# Patient Record
Sex: MALE | URBAN - METROPOLITAN AREA
[De-identification: names, ages, dates, MRNs, and addresses within clinical notes are randomized per-mention and may not be internally consistent; named-entity substitution may affect disease eponyms.]

---

## 2022-01-04 ENCOUNTER — OFFICE VISIT (OUTPATIENT)
Dept: URBAN - METROPOLITAN AREA CLINIC 96 | Facility: CLINIC | Age: 22
End: 2022-01-04

## 2022-05-17 ENCOUNTER — WEB ENCOUNTER (OUTPATIENT)
Dept: URBAN - METROPOLITAN AREA CLINIC 96 | Facility: CLINIC | Age: 22
End: 2022-05-17

## 2022-05-18 ENCOUNTER — OFFICE VISIT (OUTPATIENT)
Dept: URBAN - METROPOLITAN AREA CLINIC 96 | Facility: CLINIC | Age: 22
End: 2022-05-18
Payer: COMMERCIAL

## 2022-05-18 ENCOUNTER — LAB OUTSIDE AN ENCOUNTER (OUTPATIENT)
Dept: URBAN - METROPOLITAN AREA CLINIC 96 | Facility: CLINIC | Age: 22
End: 2022-05-18

## 2022-05-18 DIAGNOSIS — K50.90 CROHN DISEASE: ICD-10-CM

## 2022-05-18 DIAGNOSIS — Z79.899 LONG-TERM USE OF IMMUNOSUPPRESSANT MEDICATION: ICD-10-CM

## 2022-05-18 DIAGNOSIS — Z91.89 COLON CANCER HIGH RISK: ICD-10-CM

## 2022-05-18 PROCEDURE — 99204 OFFICE O/P NEW MOD 45 MIN: CPT | Performed by: INTERNAL MEDICINE

## 2022-05-18 RX ORDER — INFLIXIMAB 100 MG/10ML
AS DIRECTED INJECTION, POWDER, LYOPHILIZED, FOR SOLUTION INTRAVENOUS
Qty: 100 MILLIGRAMS | Refills: 0 | OUTPATIENT
Start: 2022-07-22 | End: 2022-08-21

## 2022-05-18 NOTE — PHYSICAL EXAM NECK/THYROID:
normal appearance , without tenderness upon palpation , no deformities , trachea midline , Thyroid normal size , no thyroid nodules , no masses , no JVD , thyroid nontender 53973 Detailed

## 2022-05-18 NOTE — HPI-TODAY'S VISIT:
Pt Dino is a 21 y/o with ileal CD, currently on Remicade 5mg/kg (every 8 weeks). . Pt is referred by Dr. El Ivan. . Pt was dxd with CD at age 15.  He was started on Remicade upon diagnosis.   No surgeries or fistula.  No hospitalizations or steroid use. . Today on 5/18/2022, pt reports that he has normal BM.  No diarrhea and or rectal bleeding.

## 2022-07-22 ENCOUNTER — CLAIMS CREATED FROM THE CLAIM WINDOW (OUTPATIENT)
Dept: URBAN - METROPOLITAN AREA SURGERY CENTER 18 | Facility: SURGERY CENTER | Age: 22
End: 2022-07-22
Payer: COMMERCIAL

## 2022-07-22 ENCOUNTER — CLAIMS CREATED FROM THE CLAIM WINDOW (OUTPATIENT)
Dept: URBAN - METROPOLITAN AREA SURGERY CENTER 18 | Facility: SURGERY CENTER | Age: 22
End: 2022-07-22

## 2022-07-22 DIAGNOSIS — K63.89 BACTERIAL OVERGROWTH SYNDROME: ICD-10-CM

## 2022-07-22 DIAGNOSIS — K50.00 CICATRIZING ENTEROCOLITIS: ICD-10-CM

## 2022-07-22 PROCEDURE — G8907 PT DOC NO EVENTS ON DISCHARG: HCPCS | Performed by: INTERNAL MEDICINE

## 2022-07-22 PROCEDURE — 45380 COLONOSCOPY AND BIOPSY: CPT | Performed by: INTERNAL MEDICINE

## 2022-07-30 ENCOUNTER — TELEPHONE ENCOUNTER (OUTPATIENT)
Dept: URBAN - METROPOLITAN AREA CLINIC 92 | Facility: CLINIC | Age: 22
End: 2022-07-30

## 2022-09-01 ENCOUNTER — TELEPHONE ENCOUNTER (OUTPATIENT)
Dept: URBAN - METROPOLITAN AREA CLINIC 92 | Facility: CLINIC | Age: 22
End: 2022-09-01

## 2022-09-24 LAB
A/G RATIO: 2
ALBUMIN: 4.8
ALKALINE PHOSPHATASE: 84
ALT (SGPT): 34
AST (SGOT): 34
BASO (ABSOLUTE): 0
BASOS: 0
BILIRUBIN, TOTAL: 1.3
BUN/CREATININE RATIO: 12
BUN: 12
CALCIUM: 9.9
CARBON DIOXIDE, TOTAL: 24
CHLORIDE: 99
CREATININE: 1.04
EGFR: 104
EOS (ABSOLUTE): 0.3
EOS: 5
GLOBULIN, TOTAL: 2.4
GLUCOSE: 112
HEMATOCRIT: 43.8
HEMATOLOGY COMMENTS:: (no result)
HEMOGLOBIN: 15.5
IMMATURE CELLS: (no result)
IMMATURE GRANS (ABS): 0
IMMATURE GRANULOCYTES: 0
LYMPHS (ABSOLUTE): 1.8
LYMPHS: 25
MCH: 31.5
MCHC: 35.4
MCV: 89
MONOCYTES(ABSOLUTE): 0.6
MONOCYTES: 8
NEUTROPHILS (ABSOLUTE): 4.6
NEUTROPHILS: 62
NRBC: (no result)
PLATELETS: 278
POTASSIUM: 4
PROTEIN, TOTAL: 7.2
QUANTIFERON CRITERIA: (no result)
QUANTIFERON INCUBATION: (no result)
QUANTIFERON MITOGEN VALUE: >10
QUANTIFERON NIL VALUE: 0.01
QUANTIFERON TB1 AG VALUE: 0
QUANTIFERON TB2 AG VALUE: 0.01
QUANTIFERON-TB GOLD PLUS: NEGATIVE
RBC: 4.92
RDW: 13.1
SODIUM: 138
WBC: 7.3

## 2022-09-28 ENCOUNTER — TELEPHONE ENCOUNTER (OUTPATIENT)
Dept: URBAN - METROPOLITAN AREA CLINIC 97 | Facility: CLINIC | Age: 22
End: 2022-09-28

## 2022-09-28 PROBLEM — 34000006 CROHN DISEASE: Status: ACTIVE | Noted: 2022-05-18

## 2022-10-06 ENCOUNTER — TELEPHONE ENCOUNTER (OUTPATIENT)
Dept: URBAN - METROPOLITAN AREA CLINIC 97 | Facility: CLINIC | Age: 22
End: 2022-10-06

## 2022-11-11 ENCOUNTER — TELEPHONE ENCOUNTER (OUTPATIENT)
Dept: URBAN - METROPOLITAN AREA CLINIC 96 | Facility: CLINIC | Age: 22
End: 2022-11-11

## 2022-11-15 ENCOUNTER — OFFICE VISIT (OUTPATIENT)
Dept: URBAN - NONMETROPOLITAN AREA CLINIC 1 | Facility: CLINIC | Age: 22
End: 2022-11-15

## 2022-11-22 ENCOUNTER — TELEPHONE ENCOUNTER (OUTPATIENT)
Dept: URBAN - METROPOLITAN AREA CLINIC 97 | Facility: CLINIC | Age: 22
End: 2022-11-22

## 2023-01-17 ENCOUNTER — OFFICE VISIT (OUTPATIENT)
Dept: URBAN - NONMETROPOLITAN AREA CLINIC 1 | Facility: CLINIC | Age: 23
End: 2023-01-17
Payer: COMMERCIAL

## 2023-01-17 VITALS
BODY MASS INDEX: 22.82 KG/M2 | WEIGHT: 163 LBS | DIASTOLIC BLOOD PRESSURE: 88 MMHG | HEIGHT: 71 IN | SYSTOLIC BLOOD PRESSURE: 148 MMHG

## 2023-01-17 DIAGNOSIS — K50.80 CROHN'S COLITIS: ICD-10-CM

## 2023-01-17 PROCEDURE — 96413 CHEMO IV INFUSION 1 HR: CPT | Performed by: INTERNAL MEDICINE

## 2023-01-18 PROBLEM — 71833008 CROHN'S DISEASE OF SMALL AND LARGE INTESTINES: Status: ACTIVE | Noted: 2023-01-18

## 2023-03-14 ENCOUNTER — OFFICE VISIT (OUTPATIENT)
Dept: URBAN - NONMETROPOLITAN AREA CLINIC 1 | Facility: CLINIC | Age: 23
End: 2023-03-14
Payer: COMMERCIAL

## 2023-03-14 VITALS
SYSTOLIC BLOOD PRESSURE: 108 MMHG | WEIGHT: 163.2 LBS | HEIGHT: 71 IN | DIASTOLIC BLOOD PRESSURE: 69 MMHG | BODY MASS INDEX: 22.85 KG/M2

## 2023-03-14 DIAGNOSIS — K50.80 CROHN'S COLITIS: ICD-10-CM

## 2023-03-14 PROCEDURE — 96413 CHEMO IV INFUSION 1 HR: CPT | Performed by: INTERNAL MEDICINE

## 2023-05-09 ENCOUNTER — OFFICE VISIT (OUTPATIENT)
Dept: URBAN - NONMETROPOLITAN AREA CLINIC 1 | Facility: CLINIC | Age: 23
End: 2023-05-09

## 2023-05-19 ENCOUNTER — TELEPHONE ENCOUNTER (OUTPATIENT)
Dept: URBAN - METROPOLITAN AREA CLINIC 6 | Facility: CLINIC | Age: 23
End: 2023-05-19

## 2023-05-22 ENCOUNTER — TELEPHONE ENCOUNTER (OUTPATIENT)
Dept: URBAN - METROPOLITAN AREA CLINIC 79 | Facility: CLINIC | Age: 23
End: 2023-05-22

## 2023-05-22 ENCOUNTER — OFFICE VISIT (OUTPATIENT)
Dept: URBAN - METROPOLITAN AREA CLINIC 79 | Facility: CLINIC | Age: 23
End: 2023-05-22
Payer: COMMERCIAL

## 2023-05-22 VITALS
HEIGHT: 71 IN | HEART RATE: 74 BPM | RESPIRATION RATE: 20 BRPM | TEMPERATURE: 98.1 F | WEIGHT: 163 LBS | BODY MASS INDEX: 22.82 KG/M2 | DIASTOLIC BLOOD PRESSURE: 77 MMHG | SYSTOLIC BLOOD PRESSURE: 129 MMHG

## 2023-05-22 DIAGNOSIS — K50.80 CROHN'S COLITIS: ICD-10-CM

## 2023-05-22 PROCEDURE — 96413 CHEMO IV INFUSION 1 HR: CPT | Performed by: INTERNAL MEDICINE

## 2023-07-03 ENCOUNTER — OFFICE VISIT (OUTPATIENT)
Dept: URBAN - METROPOLITAN AREA CLINIC 79 | Facility: CLINIC | Age: 23
End: 2023-07-03

## 2023-07-17 ENCOUNTER — OFFICE VISIT (OUTPATIENT)
Dept: URBAN - METROPOLITAN AREA CLINIC 79 | Facility: CLINIC | Age: 23
End: 2023-07-17
Payer: COMMERCIAL

## 2023-07-17 VITALS
DIASTOLIC BLOOD PRESSURE: 72 MMHG | TEMPERATURE: 98.2 F | SYSTOLIC BLOOD PRESSURE: 111 MMHG | RESPIRATION RATE: 20 BRPM | HEIGHT: 71 IN | WEIGHT: 171 LBS | HEART RATE: 67 BPM | BODY MASS INDEX: 23.94 KG/M2

## 2023-07-17 DIAGNOSIS — K50.80 CROHN'S COLITIS: ICD-10-CM

## 2023-07-17 PROCEDURE — 96413 CHEMO IV INFUSION 1 HR: CPT | Performed by: INTERNAL MEDICINE

## 2023-07-19 ENCOUNTER — OFFICE VISIT (OUTPATIENT)
Dept: URBAN - METROPOLITAN AREA CLINIC 96 | Facility: CLINIC | Age: 23
End: 2023-07-19
Payer: COMMERCIAL

## 2023-07-19 ENCOUNTER — WEB ENCOUNTER (OUTPATIENT)
Dept: URBAN - METROPOLITAN AREA CLINIC 96 | Facility: CLINIC | Age: 23
End: 2023-07-19

## 2023-07-19 ENCOUNTER — DASHBOARD ENCOUNTERS (OUTPATIENT)
Age: 23
End: 2023-07-19

## 2023-07-19 VITALS
TEMPERATURE: 97.7 F | HEIGHT: 71 IN | SYSTOLIC BLOOD PRESSURE: 127 MMHG | WEIGHT: 161.6 LBS | DIASTOLIC BLOOD PRESSURE: 87 MMHG | BODY MASS INDEX: 22.62 KG/M2 | HEART RATE: 83 BPM

## 2023-07-19 DIAGNOSIS — Z91.89 COLON CANCER HIGH RISK: ICD-10-CM

## 2023-07-19 DIAGNOSIS — K50.90 CROHN DISEASE: ICD-10-CM

## 2023-07-19 PROCEDURE — 99214 OFFICE O/P EST MOD 30 MIN: CPT | Performed by: INTERNAL MEDICINE

## 2023-07-19 NOTE — HPI-TODAY'S VISIT:
Pt Dino is a 24 y/o with ileal CD, currently on Remicade 5mg/kg (every 8 weeks). . Pt is referred by Dr. El Ivan. . Pt was dxd with CD at age 15.  He was started on Remicade upon diagnosis.   No surgeries or fistula.  No hospitalizations or steroid use. . Previously on 5/18/2022, pt reports that he has normal BM.  No diarrhea and or rectal bleeding. . Today on 7/19/2023, pt reports normal BM.  No Gi issues. . 7/2022: The terminal ileum appeared normal. Biopsies were taken with a cold forceps for histology. The pathology specimen was placed into Bottle Number 1. An area of erythematous mucosa was found in the ascending colon. This was biopsied with a cold forceps for histology. The pathology specimen was placed into Bottle Number 2. The descending colon appeared normal. Biopsies were taken with a cold forceps for histology. The pathology specimen was placed into Bottle Number 3. The exam was otherwise without abnormality on direct and retroflexion views. Overall, good control of disease state. . Final Pathologic Diagnosis A Terminal ileum, biopsy: Small bowel mucosa with no significant histopathology. No histologic evidence of acute inflammation or infectious microorganisms. B Ascending colon, biopsy: Unremarkable colonic mucosa with a lymphoid aggregate. C Descending colon, biopsy: Unremarkable colonic mucosa with a lymphoid aggregate.

## 2023-08-01 ENCOUNTER — LAB OUTSIDE AN ENCOUNTER (OUTPATIENT)
Dept: URBAN - METROPOLITAN AREA CLINIC 96 | Facility: CLINIC | Age: 23
End: 2023-08-01

## 2023-08-05 LAB
A/G RATIO: 1.9
ABSOLUTE BASOPHILS: 28
ABSOLUTE EOSINOPHILS: 280
ABSOLUTE LYMPHOCYTES: 1915
ABSOLUTE MONOCYTES: 392
ABSOLUTE NEUTROPHILS: 2985
ALBUMIN: 4.7
ALKALINE PHOSPHATASE: 70
ALT (SGPT): 36
AST (SGOT): 29
BASOPHILS: 0.5
BILIRUBIN, TOTAL: 0.5
BUN/CREATININE RATIO: (no result)
BUN: 10
CALCIUM: 9.6
CARBON DIOXIDE, TOTAL: 25
CHLORIDE: 105
CREATININE: 0.9
EGFR: 123
EOSINOPHILS: 5
FERRITIN, SERUM: 59
GLOBULIN, TOTAL: 2.5
GLUCOSE: 88
HEMATOCRIT: 48.2
HEMOGLOBIN: 16.9
LYMPHOCYTES: 34.2
MCH: 31.2
MCHC: 35.1
MCV: 89.1
MITOGEN-NIL: >10
MONOCYTES: 7
MPV: 9.7
NEUTROPHILS: 53.3
PLATELET COUNT: 262
POTASSIUM: 4.3
PROTEIN, TOTAL: 7.2
QUANTIFERON NIL VALUE: 0.02
QUANTIFERON TB1 AG VALUE: 0
QUANTIFERON TB2 AG VALUE: 0
QUANTIFERON-TB GOLD PLUS: NEGATIVE
RDW: 11.8
RED BLOOD CELL COUNT: 5.41
SODIUM: 140
VITAMIN B12: 429
VITAMIN D,25-OH,TOTAL,IA: 36
WHITE BLOOD CELL COUNT: 5.6

## 2023-09-13 ENCOUNTER — TELEPHONE ENCOUNTER (OUTPATIENT)
Dept: URBAN - METROPOLITAN AREA CLINIC 79 | Facility: CLINIC | Age: 23
End: 2023-09-13

## 2023-09-21 ENCOUNTER — OFFICE VISIT (OUTPATIENT)
Dept: URBAN - METROPOLITAN AREA CLINIC 79 | Facility: CLINIC | Age: 23
End: 2023-09-21
Payer: COMMERCIAL

## 2023-09-21 VITALS
HEIGHT: 71 IN | HEART RATE: 73 BPM | RESPIRATION RATE: 18 BRPM | BODY MASS INDEX: 24.08 KG/M2 | WEIGHT: 172 LBS | SYSTOLIC BLOOD PRESSURE: 137 MMHG | DIASTOLIC BLOOD PRESSURE: 69 MMHG | TEMPERATURE: 98.4 F

## 2023-09-21 DIAGNOSIS — K50.80 CROHN'S COLITIS: ICD-10-CM

## 2023-09-21 PROCEDURE — 96413 CHEMO IV INFUSION 1 HR: CPT | Performed by: INTERNAL MEDICINE

## 2023-11-02 ENCOUNTER — TELEPHONE ENCOUNTER (OUTPATIENT)
Dept: URBAN - METROPOLITAN AREA CLINIC 97 | Facility: CLINIC | Age: 23
End: 2023-11-02

## 2023-11-10 ENCOUNTER — OFFICE VISIT (OUTPATIENT)
Dept: URBAN - METROPOLITAN AREA CLINIC 79 | Facility: CLINIC | Age: 23
End: 2023-11-10

## 2023-11-17 ENCOUNTER — OFFICE VISIT (OUTPATIENT)
Dept: URBAN - METROPOLITAN AREA CLINIC 79 | Facility: CLINIC | Age: 23
End: 2023-11-17
Payer: COMMERCIAL

## 2023-11-17 VITALS
TEMPERATURE: 98.2 F | BODY MASS INDEX: 23.1 KG/M2 | RESPIRATION RATE: 18 BRPM | WEIGHT: 165 LBS | DIASTOLIC BLOOD PRESSURE: 79 MMHG | HEART RATE: 52 BPM | SYSTOLIC BLOOD PRESSURE: 145 MMHG | HEIGHT: 71 IN

## 2023-11-17 DIAGNOSIS — K50.80 CROHN'S COLITIS: ICD-10-CM

## 2023-11-17 PROCEDURE — 96413 CHEMO IV INFUSION 1 HR: CPT | Performed by: INTERNAL MEDICINE

## 2023-12-29 ENCOUNTER — TELEPHONE ENCOUNTER (OUTPATIENT)
Dept: URBAN - METROPOLITAN AREA CLINIC 23 | Facility: CLINIC | Age: 23
End: 2023-12-29

## 2024-01-08 ENCOUNTER — OFFICE VISIT (OUTPATIENT)
Dept: URBAN - METROPOLITAN AREA CLINIC 97 | Facility: CLINIC | Age: 24
End: 2024-01-08
Payer: COMMERCIAL

## 2024-01-08 VITALS
RESPIRATION RATE: 18 BRPM | DIASTOLIC BLOOD PRESSURE: 76 MMHG | HEIGHT: 71 IN | WEIGHT: 168 LBS | HEART RATE: 60 BPM | BODY MASS INDEX: 23.52 KG/M2 | TEMPERATURE: 99 F | SYSTOLIC BLOOD PRESSURE: 137 MMHG

## 2024-01-08 DIAGNOSIS — K50.80 CROHN'S COLITIS: ICD-10-CM

## 2024-01-08 PROCEDURE — 96413 CHEMO IV INFUSION 1 HR: CPT | Performed by: INTERNAL MEDICINE

## 2024-01-12 ENCOUNTER — TELEPHONE ENCOUNTER (OUTPATIENT)
Dept: URBAN - METROPOLITAN AREA CLINIC 96 | Facility: CLINIC | Age: 24
End: 2024-01-12

## 2024-01-12 ENCOUNTER — OFFICE VISIT (OUTPATIENT)
Dept: URBAN - METROPOLITAN AREA CLINIC 79 | Facility: CLINIC | Age: 24
End: 2024-01-12

## 2024-05-06 ENCOUNTER — WEB ENCOUNTER (OUTPATIENT)
Dept: URBAN - METROPOLITAN AREA CLINIC 96 | Facility: CLINIC | Age: 24
End: 2024-05-06

## 2024-07-15 ENCOUNTER — TELEPHONE ENCOUNTER (OUTPATIENT)
Dept: URBAN - METROPOLITAN AREA CLINIC 96 | Facility: CLINIC | Age: 24
End: 2024-07-15

## 2024-07-15 ENCOUNTER — LAB OUTSIDE AN ENCOUNTER (OUTPATIENT)
Dept: URBAN - METROPOLITAN AREA TELEHEALTH 2 | Facility: TELEHEALTH | Age: 24
End: 2024-07-15

## 2024-07-15 ENCOUNTER — OFFICE VISIT (OUTPATIENT)
Dept: URBAN - METROPOLITAN AREA TELEHEALTH 2 | Facility: TELEHEALTH | Age: 24
End: 2024-07-15
Payer: COMMERCIAL

## 2024-07-15 VITALS — BODY MASS INDEX: 21 KG/M2 | HEIGHT: 71 IN | WEIGHT: 150 LBS

## 2024-07-15 DIAGNOSIS — K50.80 CROHN'S COLITIS: ICD-10-CM

## 2024-07-15 DIAGNOSIS — Z79.899 LONG-TERM USE OF IMMUNOSUPPRESSANT MEDICATION: ICD-10-CM

## 2024-07-15 DIAGNOSIS — Z91.89 COLON CANCER HIGH RISK: ICD-10-CM

## 2024-07-15 PROCEDURE — 99214 OFFICE O/P EST MOD 30 MIN: CPT | Performed by: INTERNAL MEDICINE

## 2024-07-15 RX ORDER — INFLIXIMAB-AXXQ 100 MG/10ML
AS DIRECTED INJECTION, POWDER, LYOPHILIZED, FOR SOLUTION INTRAVENOUS
Status: ACTIVE | COMMUNITY

## 2024-07-15 RX ORDER — INFLIXIMAB 100 MG/10ML
AS DIRECTED INJECTION, POWDER, LYOPHILIZED, FOR SOLUTION INTRAVENOUS
OUTPATIENT
Start: 2024-07-15

## 2024-07-15 NOTE — HPI-TODAY'S VISIT:
Cecil Sun is a 22 y/o with ileal CD, currently on Avsola 5mg/kg (every 8 weeks). . Pt is referred by Dr. El Ivan. . Pt was dxd with CD at age 15.  He was started on Remicade upon diagnosis.   No surgeries or fistula.  No hospitalizations or steroid use. . Previously on 5/18/2022, pt reports that he has normal BM.  No diarrhea and or rectal bleeding. . Previously on 7/19/2023, pt reports normal BM.  No Gi issues. . Today on 7/15/2024, pt overall doing well. He has flares every now and then, not prior to each infusion but very mild abd pain and some diarrhea. Typically no pattern. . 7/2022: The terminal ileum appeared normal. Biopsies were taken with a cold forceps for histology. The pathology specimen was placed into Bottle Number 1. An area of erythematous mucosa was found in the ascending colon. This was biopsied with a cold forceps for histology. The pathology specimen was placed into Bottle Number 2. The descending colon appeared normal. Biopsies were taken with a cold forceps for histology. The pathology specimen was placed into Bottle Number 3. The exam was otherwise without abnormality on direct and retroflexion views. Overall, good control of disease state. . Final Pathologic Diagnosis A Terminal ileum, biopsy: Small bowel mucosa with no significant histopathology. No histologic evidence of acute inflammation or infectious microorganisms. B Ascending colon, biopsy: Unremarkable colonic mucosa with a lymphoid aggregate. C Descending colon, biopsy: Unremarkable colonic mucosa with a lymphoid aggregate.

## 2024-07-22 ENCOUNTER — WEB ENCOUNTER (OUTPATIENT)
Dept: URBAN - METROPOLITAN AREA CLINIC 98 | Facility: CLINIC | Age: 24
End: 2024-07-22

## 2024-07-31 ENCOUNTER — OFFICE VISIT (OUTPATIENT)
Dept: URBAN - METROPOLITAN AREA TELEHEALTH 2 | Facility: TELEHEALTH | Age: 24
End: 2024-07-31
Payer: COMMERCIAL

## 2024-07-31 DIAGNOSIS — Z79.899 LONG-TERM USE OF IMMUNOSUPPRESSANT MEDICATION: ICD-10-CM

## 2024-07-31 DIAGNOSIS — Z91.89 COLON CANCER HIGH RISK: ICD-10-CM

## 2024-07-31 DIAGNOSIS — K50.90 CROHN DISEASE: ICD-10-CM

## 2024-07-31 PROCEDURE — 99213 OFFICE O/P EST LOW 20 MIN: CPT | Performed by: INTERNAL MEDICINE

## 2024-07-31 RX ORDER — INFLIXIMAB 100 MG/10ML
AS DIRECTED INJECTION, POWDER, LYOPHILIZED, FOR SOLUTION INTRAVENOUS
OUTPATIENT

## 2024-07-31 RX ORDER — INFLIXIMAB-AXXQ 100 MG/10ML
AS DIRECTED INJECTION, POWDER, LYOPHILIZED, FOR SOLUTION INTRAVENOUS
Status: ACTIVE | COMMUNITY

## 2024-07-31 RX ORDER — INFLIXIMAB 100 MG/10ML
AS DIRECTED INJECTION, POWDER, LYOPHILIZED, FOR SOLUTION INTRAVENOUS
Status: ACTIVE | COMMUNITY
Start: 2024-07-15

## 2024-07-31 NOTE — HPI-TODAY'S VISIT:
Pt Dino is a 25 y/o with ileal CD, currently on Avsola 5mg/kg (every 8 weeks). . Pt is referred by Dr. El Ivan. . Pt was dxd with CD at age 15.  He was started on Remicade upon diagnosis.   No surgeries or fistula.  No hospitalizations or steroid use. . Previously on 5/18/2022, pt reports that he has normal BM.  No diarrhea and or rectal bleeding. . Previously on 7/19/2023, pt reports normal BM.  No Gi issues. . Prev on 7/15/2024, pt overall doing well. He has flares every now and then, not prior to each infusion but very mild abd pain and some diarrhea. Typically no pattern. . Today on 7/31/2024, pt reports incomplete evacuation of urine.  No diarrhea, abd pain, rectal bleeding. . 7/2022: The terminal ileum appeared normal. Biopsies were taken with a cold forceps for histology. The pathology specimen was placed into Bottle Number 1. An area of erythematous mucosa was found in the ascending colon. This was biopsied with a cold forceps for histology. The pathology specimen was placed into Bottle Number 2. The descending colon appeared normal. Biopsies were taken with a cold forceps for histology. The pathology specimen was placed into Bottle Number 3. The exam was otherwise without abnormality on direct and retroflexion views. Overall, good control of disease state. . Final Pathologic Diagnosis A Terminal ileum, biopsy: Small bowel mucosa with no significant histopathology. No histologic evidence of acute inflammation or infectious microorganisms. B Ascending colon, biopsy: Unremarkable colonic mucosa with a lymphoid aggregate. C Descending colon, biopsy: Unremarkable colonic mucosa with a lymphoid aggregate.

## 2024-08-21 ENCOUNTER — TELEPHONE ENCOUNTER (OUTPATIENT)
Dept: URBAN - METROPOLITAN AREA CLINIC 96 | Facility: CLINIC | Age: 24
End: 2024-08-21

## 2024-08-26 ENCOUNTER — LAB OUTSIDE AN ENCOUNTER (OUTPATIENT)
Dept: URBAN - METROPOLITAN AREA CLINIC 96 | Facility: CLINIC | Age: 24
End: 2024-08-26

## 2024-08-26 ENCOUNTER — WEB ENCOUNTER (OUTPATIENT)
Dept: URBAN - METROPOLITAN AREA CLINIC 96 | Facility: CLINIC | Age: 24
End: 2024-08-26

## 2024-09-27 ENCOUNTER — WEB ENCOUNTER (OUTPATIENT)
Dept: URBAN - METROPOLITAN AREA CLINIC 98 | Facility: CLINIC | Age: 24
End: 2024-09-27

## 2024-10-01 ENCOUNTER — WEB ENCOUNTER (OUTPATIENT)
Dept: URBAN - METROPOLITAN AREA CLINIC 98 | Facility: CLINIC | Age: 24
End: 2024-10-01

## 2024-10-04 ENCOUNTER — TELEPHONE ENCOUNTER (OUTPATIENT)
Dept: URBAN - METROPOLITAN AREA CLINIC 96 | Facility: CLINIC | Age: 24
End: 2024-10-04

## 2024-10-04 RX ORDER — SODIUM, POTASSIUM,MAG SULFATES 17.5-3.13G
AS DIRECTED SOLUTION, RECONSTITUTED, ORAL ORAL
Qty: 1 | Refills: 0 | OUTPATIENT
Start: 2024-10-07 | End: 2024-10-09

## 2024-10-11 ENCOUNTER — CLAIMS CREATED FROM THE CLAIM WINDOW (OUTPATIENT)
Dept: URBAN - METROPOLITAN AREA SURGERY CENTER 18 | Facility: SURGERY CENTER | Age: 24
End: 2024-10-11
Payer: COMMERCIAL

## 2024-10-11 ENCOUNTER — CLAIMS CREATED FROM THE CLAIM WINDOW (OUTPATIENT)
Dept: URBAN - METROPOLITAN AREA CLINIC 4 | Facility: CLINIC | Age: 24
End: 2024-10-11
Payer: COMMERCIAL

## 2024-10-11 ENCOUNTER — WEB ENCOUNTER (OUTPATIENT)
Dept: URBAN - METROPOLITAN AREA CLINIC 98 | Facility: CLINIC | Age: 24
End: 2024-10-11

## 2024-10-11 DIAGNOSIS — K50.00 CROHN''S DISEASE OF ILEUM WITHOUT COMPLICATION: ICD-10-CM

## 2024-10-11 DIAGNOSIS — K50.00 CROHN'S DISEASE OF ILEUM: ICD-10-CM

## 2024-10-11 DIAGNOSIS — K63.89 OTHER SPECIFIED DISEASES OF INTESTINE: ICD-10-CM

## 2024-10-11 PROCEDURE — 88305 TISSUE EXAM BY PATHOLOGIST: CPT | Performed by: PATHOLOGY

## 2024-10-11 PROCEDURE — 00812 ANES LWR INTST SCR COLSC: CPT | Performed by: REGISTERED NURSE

## 2024-10-11 PROCEDURE — 45380 COLONOSCOPY AND BIOPSY: CPT | Performed by: INTERNAL MEDICINE

## 2024-10-11 RX ORDER — FOLIC ACID 1 MG/1
1 TABLET TABLET ORAL ONCE A DAY
Qty: 30 | Refills: 11
Start: 2024-10-11 | End: 2025-10-09

## 2024-10-11 RX ORDER — FOLIC ACID 1 MG/1
1 TABLET TABLET ORAL ONCE A DAY
Qty: 30 | Refills: 11 | OUTPATIENT
Start: 2024-10-11 | End: 2025-10-06

## 2024-10-11 RX ORDER — METHOTREXATE SODIUM 2.5 MG/1
3 TAB TABLET ORAL WEEKLY
Qty: 12 | Refills: 4
Start: 2024-10-11

## 2024-10-11 RX ORDER — INFLIXIMAB 100 MG/10ML
AS DIRECTED INJECTION, POWDER, LYOPHILIZED, FOR SOLUTION INTRAVENOUS
Status: ACTIVE | COMMUNITY

## 2024-10-11 RX ORDER — METHOTREXATE SODIUM 2.5 MG/1
3 TAB TABLET ORAL WEEKLY
Qty: 12 | Refills: 4 | OUTPATIENT
Start: 2024-10-11

## 2024-10-11 RX ORDER — INFLIXIMAB-AXXQ 100 MG/10ML
AS DIRECTED INJECTION, POWDER, LYOPHILIZED, FOR SOLUTION INTRAVENOUS
Status: ACTIVE | COMMUNITY

## 2024-10-15 ENCOUNTER — WEB ENCOUNTER (OUTPATIENT)
Dept: URBAN - METROPOLITAN AREA CLINIC 98 | Facility: CLINIC | Age: 24
End: 2024-10-15

## 2024-10-15 RX ORDER — FOLIC ACID 1 MG/1
1 TABLET TABLET ORAL ONCE A DAY
Qty: 30 | Refills: 11
Start: 2024-10-11 | End: 2025-10-11

## 2024-10-15 RX ORDER — METHOTREXATE SODIUM 2.5 MG/1
3 TAB TABLET ORAL WEEKLY
Qty: 12 | Refills: 4
Start: 2024-10-11

## 2025-02-19 ENCOUNTER — WEB ENCOUNTER (OUTPATIENT)
Dept: URBAN - METROPOLITAN AREA CLINIC 96 | Facility: CLINIC | Age: 25
End: 2025-02-19

## 2025-02-26 ENCOUNTER — OFFICE VISIT (OUTPATIENT)
Dept: URBAN - METROPOLITAN AREA TELEHEALTH 2 | Facility: TELEHEALTH | Age: 25
End: 2025-02-26
Payer: COMMERCIAL

## 2025-02-26 DIAGNOSIS — Z91.89 COLON CANCER HIGH RISK: ICD-10-CM

## 2025-02-26 DIAGNOSIS — K50.019 CROHN'S DISEASE OF ILEUM WITH COMPLICATION: ICD-10-CM

## 2025-02-26 DIAGNOSIS — Z79.899 LONG-TERM USE OF IMMUNOSUPPRESSANT MEDICATION: ICD-10-CM

## 2025-02-26 PROCEDURE — 99214 OFFICE O/P EST MOD 30 MIN: CPT | Performed by: INTERNAL MEDICINE

## 2025-02-26 RX ORDER — BUDESONIDE 3 MG/1
3 CAPSULES CAPSULE, DELAYED RELEASE PELLETS ORAL ONCE A DAY
Qty: 90 CAPSULE | Refills: 11 | OUTPATIENT
Start: 2025-02-26

## 2025-02-26 RX ORDER — METHOTREXATE SODIUM 2.5 MG/1
3 TAB TABLET ORAL WEEKLY
Qty: 12 | Refills: 4 | Status: ACTIVE | COMMUNITY
Start: 2024-10-11

## 2025-02-26 RX ORDER — HYOSCYAMINE SULFATE 0.12 MG/1
1 TABLET AS NEEDED TABLET ORAL
Qty: 120 | Refills: 3 | OUTPATIENT
Start: 2025-02-26 | End: 2025-06-26

## 2025-02-26 RX ORDER — INFLIXIMAB-AXXQ 100 MG/10ML
AS DIRECTED INJECTION, POWDER, LYOPHILIZED, FOR SOLUTION INTRAVENOUS
Status: ON HOLD | COMMUNITY

## 2025-02-26 RX ORDER — FOLIC ACID 1 MG/1
1 TABLET TABLET ORAL ONCE A DAY
Qty: 30 | Refills: 11 | Status: ACTIVE | COMMUNITY
Start: 2024-10-11 | End: 2025-10-11

## 2025-02-26 RX ORDER — INFLIXIMAB 100 MG/10ML
AS DIRECTED INJECTION, POWDER, LYOPHILIZED, FOR SOLUTION INTRAVENOUS
OUTPATIENT

## 2025-02-26 RX ORDER — INFLIXIMAB 100 MG/10ML
AS DIRECTED INJECTION, POWDER, LYOPHILIZED, FOR SOLUTION INTRAVENOUS
Status: ON HOLD | COMMUNITY

## 2025-02-26 NOTE — HPI-TODAY'S VISIT:
Pt Dino is a 24 y/o with ileal CD, currently on Avsola 5mg/kg (every 8 weeks), MTX 3 tab (started 2023). . Pt is referred by Dr. El Ivna. . Pt was dxd with CD at age 15.  He was started on Remicade upon diagnosis.   No surgeries or fistula.  No hospitalizations or steroid use. . Previously on 5/18/2022, pt reports that he has normal BM.  No diarrhea and or rectal bleeding. . Previously on 7/19/2023, pt reports normal BM.  No Gi issues. . Prev on 7/15/2024, pt overall doing well. He has flares every now and then, not prior to each infusion but very mild abd pain and some diarrhea. Typically no pattern. . Prev on 7/31/2024, pt reports incomplete evacuation of urine.  No diarrhea, abd pain, rectal bleeding. . Today on 2/26/2025, pt reports that he has some lower abdominal pain, episodes of bowel incontinence, then urgency sxs with sxs of blockage. . 10/2024: The exam was otherwise without abnormality. No additional abnormalities were found on retroflexion. Biopsies were obtained in the terminal ileum with cold forceps for histology. The colon (entire examined portion) appeared normal. Scattered mild inflammation characterized by erosions, granularity and erythema was found in the terminal ileum. . Terminal Ileum, Biopsy (Cold Forceps): CHRONIC MILDLY ACTIVE ILEITIS. Negative for Dysplasi . 7/2022: The terminal ileum appeared normal. Biopsies were taken with a cold forceps for histology. The pathology specimen was placed into Bottle Number 1. An area of erythematous mucosa was found in the ascending colon. This was biopsied with a cold forceps for histology. The pathology specimen was placed into Bottle Number 2. The descending colon appeared normal. Biopsies were taken with a cold forceps for histology. The pathology specimen was placed into Bottle Number 3. The exam was otherwise without abnormality on direct and retroflexion views. Overall, good control of disease state. . Final Pathologic Diagnosis A Terminal ileum, biopsy: Small bowel mucosa with no significant histopathology. No histologic evidence of acute inflammation or infectious microorganisms. B Ascending colon, biopsy: Unremarkable colonic mucosa with a lymphoid aggregate. C Descending colon, biopsy: Unremarkable colonic mucosa with a lymphoid aggregate . SH: works at [employer]

## 2025-03-09 ENCOUNTER — WEB ENCOUNTER (OUTPATIENT)
Dept: URBAN - METROPOLITAN AREA CLINIC 96 | Facility: CLINIC | Age: 25
End: 2025-03-09

## 2025-03-09 RX ORDER — BUDESONIDE 3 MG/1
3 CAPSULES CAPSULE, DELAYED RELEASE PELLETS ORAL ONCE A DAY
Qty: 90 CAPSULE | Refills: 11
Start: 2025-02-26

## 2025-03-26 ENCOUNTER — TELEPHONE ENCOUNTER (OUTPATIENT)
Dept: URBAN - METROPOLITAN AREA CLINIC 96 | Facility: CLINIC | Age: 25
End: 2025-03-26

## 2025-03-26 RX ORDER — METHOTREXATE SODIUM 2.5 MG/1
3 TAB TABLET ORAL WEEKLY
Qty: 12 | Refills: 4
Start: 2024-10-11

## 2025-03-26 RX ORDER — FOLIC ACID 1 MG/1
1 TABLET TABLET ORAL ONCE A DAY
Qty: 30 | Refills: 11
Start: 2024-10-11

## 2025-04-16 ENCOUNTER — WEB ENCOUNTER (OUTPATIENT)
Dept: URBAN - METROPOLITAN AREA CLINIC 96 | Facility: CLINIC | Age: 25
End: 2025-04-16

## 2025-05-13 ENCOUNTER — OFFICE VISIT (OUTPATIENT)
Dept: URBAN - METROPOLITAN AREA TELEHEALTH 2 | Facility: TELEHEALTH | Age: 25
End: 2025-05-13
Payer: COMMERCIAL

## 2025-05-13 DIAGNOSIS — T88.7XXA DRUG REACTION: ICD-10-CM

## 2025-05-13 DIAGNOSIS — Z79.899 LONG-TERM USE OF IMMUNOSUPPRESSANT MEDICATION: ICD-10-CM

## 2025-05-13 DIAGNOSIS — K50.019 CROHN'S DISEASE OF ILEUM WITH COMPLICATION: ICD-10-CM

## 2025-05-13 DIAGNOSIS — Z91.89 COLON CANCER HIGH RISK: ICD-10-CM

## 2025-05-13 PROCEDURE — 99215 OFFICE O/P EST HI 40 MIN: CPT | Performed by: INTERNAL MEDICINE

## 2025-05-13 RX ORDER — HYOSCYAMINE SULFATE 0.12 MG/1
1 TABLET AS NEEDED TABLET ORAL
Qty: 120 | Refills: 3 | Status: ACTIVE | COMMUNITY
Start: 2025-02-26 | End: 2025-06-26

## 2025-05-13 RX ORDER — HYOSCYAMINE SULFATE 0.12 MG/1
1 TABLET AS NEEDED TABLET ORAL
Qty: 120 | Refills: 3 | OUTPATIENT
Start: 2025-05-13

## 2025-05-13 RX ORDER — BUDESONIDE 3 MG/1
3 CAPSULES CAPSULE, DELAYED RELEASE PELLETS ORAL ONCE A DAY
Qty: 90 CAPSULE | Refills: 11 | OUTPATIENT
Start: 2025-05-13

## 2025-05-13 RX ORDER — METHOTREXATE SODIUM 2.5 MG/1
3 TAB TABLET ORAL WEEKLY
Qty: 12 | Refills: 4 | Status: ACTIVE | COMMUNITY
Start: 2024-10-11

## 2025-05-13 RX ORDER — INFLIXIMAB 100 MG/10ML
AS DIRECTED INJECTION, POWDER, LYOPHILIZED, FOR SOLUTION INTRAVENOUS
Status: ON HOLD | COMMUNITY

## 2025-05-13 RX ORDER — INFLIXIMAB-AXXQ 100 MG/10ML
AS DIRECTED INJECTION, POWDER, LYOPHILIZED, FOR SOLUTION INTRAVENOUS
Status: ON HOLD | COMMUNITY

## 2025-05-13 RX ORDER — BUDESONIDE 3 MG/1
3 CAPSULES CAPSULE, DELAYED RELEASE PELLETS ORAL ONCE A DAY
Qty: 90 CAPSULE | Refills: 11 | Status: ACTIVE | COMMUNITY
Start: 2025-02-26

## 2025-05-13 RX ORDER — INFLIXIMAB 100 MG/10ML
AS DIRECTED INJECTION, POWDER, LYOPHILIZED, FOR SOLUTION INTRAVENOUS
OUTPATIENT
Start: 2025-05-13

## 2025-05-13 RX ORDER — FOLIC ACID 1 MG/1
1 TABLET TABLET ORAL ONCE A DAY
Qty: 30 | Refills: 11 | Status: ACTIVE | COMMUNITY
Start: 2024-10-11

## 2025-05-13 NOTE — HPI-TODAY'S VISIT:
Pt Dino is a 26 y/o with ileal CD, currently on Avsola 5mg/kg (every 8 weeks), MTX 3 tab (started 2023). . Pt is referred by Dr. El Ivan. . Pt was dxd with CD at age 15.  He was started on Remicade upon diagnosis.   No surgeries or fistula.  No hospitalizations or steroid use. . Previously on 5/18/2022, pt reports that he has normal BM.  No diarrhea and or rectal bleeding. . Previously on 7/19/2023, pt reports normal BM.  No Gi issues. . Prev on 7/15/2024, pt overall doing well. He has flares every now and then, not prior to each infusion but very mild abd pain and some diarrhea. Typically no pattern. . Prev on 7/31/2024, pt reports incomplete evacuation of urine.  No diarrhea, abd pain, rectal bleeding. Prev on 2/26/2025, pt reports that he has some lower abdominal pain, episodes of bowel incontinence, then urgency sxs with sxs of blockage. . Today on 5/13/2025, pt reports that he still has occasional abd pain, no gerd sxs, no constipation or diarrhea.  He did better on Remicade compared to prior. . 10/2024: The exam was otherwise without abnormality. No additional abnormalities were found on retroflexion. Biopsies were obtained in the terminal ileum with cold forceps for histology. The colon (entire examined portion) appeared normal. Scattered mild inflammation characterized by erosions, granularity and erythema was found in the terminal ileum. . Terminal Ileum, Biopsy (Cold Forceps): CHRONIC MILDLY ACTIVE ILEITIS. Negative for Dysplasi . 7/2022: The terminal ileum appeared normal. Biopsies were taken with a cold forceps for histology. The pathology specimen was placed into Bottle Number 1. An area of erythematous mucosa was found in the ascending colon. This was biopsied with a cold forceps for histology. The pathology specimen was placed into Bottle Number 2. The descending colon appeared normal. Biopsies were taken with a cold forceps for histology. The pathology specimen was placed into Bottle Number 3. The exam was otherwise without abnormality on direct and retroflexion views. Overall, good control of disease state. . Final Pathologic Diagnosis A Terminal ileum, biopsy: Small bowel mucosa with no significant histopathology. No histologic evidence of acute inflammation or infectious microorganisms. B Ascending colon, biopsy: Unremarkable colonic mucosa with a lymphoid aggregate. C Descending colon, biopsy: Unremarkable colonic mucosa with a lymphoid aggregate . SH: works at [employer]

## 2025-07-08 ENCOUNTER — LAB OUTSIDE AN ENCOUNTER (OUTPATIENT)
Dept: URBAN - METROPOLITAN AREA CLINIC 96 | Facility: CLINIC | Age: 25
End: 2025-07-08

## 2025-07-11 LAB
A/G RATIO: 1.7
ABSOLUTE BASOPHILS: 31
ABSOLUTE EOSINOPHILS: 234
ABSOLUTE LYMPHOCYTES: 2356
ABSOLUTE MONOCYTES: 554
ABSOLUTE NEUTROPHILS: 4625
ALBUMIN: 4.5
ALKALINE PHOSPHATASE: 68
ALT (SGPT): 22
AST (SGOT): 18
BASOPHILS: 0.4
BILIRUBIN, TOTAL: 0.5
BUN/CREATININE RATIO: (no result)
BUN: 13
CALCIUM: 9.7
CARBON DIOXIDE, TOTAL: 28
CHLORIDE: 103
COMMENT: (no result)
CREATININE: 0.93
EGFR: 117
EOSINOPHILS: 3
GLOBULIN, TOTAL: 2.7
GLUCOSE: 85
HEMATOCRIT: 46
HEMOGLOBIN: 16.3
INFLIXIMAB AB, IBD: <10
INFLIXIMAB LEVEL, IBD: 18.7
INTERPRETATION: (no result)
LYMPHOCYTES: 30.2
MCH: 30.8
MCHC: 35.4
MCV: 87
MITOGEN-NIL: >10
MONOCYTES: 7.1
MPV: 9.7
NEUTROPHILS: 59.3
PLATELET COUNT: 318
POTASSIUM: 4
PROTEIN, TOTAL: 7.2
QUANTIFERON NIL VALUE: 0.02
QUANTIFERON TB1 AG VALUE: 0
QUANTIFERON TB2 AG VALUE: 0.01
QUANTIFERON-TB GOLD PLUS: NEGATIVE
RDW: 12.4
RED BLOOD CELL COUNT: 5.29
SODIUM: 138
VITAMIN D,25-OH,TOTAL,IA: 31
WHITE BLOOD CELL COUNT: 7.8

## 2025-07-14 ENCOUNTER — OFFICE VISIT (OUTPATIENT)
Dept: URBAN - METROPOLITAN AREA TELEHEALTH 2 | Facility: TELEHEALTH | Age: 25
End: 2025-07-14